# Patient Record
Sex: MALE | Race: WHITE | NOT HISPANIC OR LATINO | Employment: FULL TIME | URBAN - METROPOLITAN AREA
[De-identification: names, ages, dates, MRNs, and addresses within clinical notes are randomized per-mention and may not be internally consistent; named-entity substitution may affect disease eponyms.]

---

## 2019-01-11 ENCOUNTER — HOSPITAL ENCOUNTER (OUTPATIENT)
Dept: RADIOLOGY | Facility: MEDICAL CENTER | Age: 24
End: 2019-01-11
Attending: PHYSICIAN ASSISTANT
Payer: COMMERCIAL

## 2019-01-11 DIAGNOSIS — M25.312 INSTABILITY OF LEFT SHOULDER JOINT: ICD-10-CM

## 2019-01-11 DIAGNOSIS — M25.512 LEFT SHOULDER PAIN, UNSPECIFIED CHRONICITY: ICD-10-CM

## 2019-01-11 PROCEDURE — 73222 MRI JOINT UPR EXTREM W/DYE: CPT | Mod: LT

## 2019-01-11 PROCEDURE — 700101 HCHG RX REV CODE 250

## 2019-01-11 PROCEDURE — 700117 HCHG RX CONTRAST REV CODE 255: Performed by: PHYSICIAN ASSISTANT

## 2019-01-11 PROCEDURE — A9585 GADOBUTROL INJECTION: HCPCS | Performed by: PHYSICIAN ASSISTANT

## 2019-01-11 PROCEDURE — 23350 INJECTION FOR SHOULDER X-RAY: CPT | Mod: LT

## 2019-01-11 RX ORDER — GADOBUTROL 604.72 MG/ML
0.1 INJECTION INTRAVENOUS ONCE
Status: COMPLETED | OUTPATIENT
Start: 2019-01-11 | End: 2019-01-11

## 2019-01-11 RX ORDER — LIDOCAINE HYDROCHLORIDE 10 MG/ML
INJECTION, SOLUTION INFILTRATION; PERINEURAL
Status: DISCONTINUED
Start: 2019-01-11 | End: 2019-01-12 | Stop reason: HOSPADM

## 2019-01-11 RX ADMIN — GADOBUTROL 0.1 ML: 604.72 INJECTION INTRAVENOUS at 15:36

## 2019-01-11 RX ADMIN — IOHEXOL 5 ML: 300 INJECTION, SOLUTION INTRAVENOUS at 15:36

## 2019-02-04 ENCOUNTER — APPOINTMENT (OUTPATIENT)
Dept: ADMISSIONS | Facility: MEDICAL CENTER | Age: 24
End: 2019-02-04
Attending: ORTHOPAEDIC SURGERY
Payer: COMMERCIAL

## 2019-02-05 ENCOUNTER — HOSPITAL ENCOUNTER (OUTPATIENT)
Facility: MEDICAL CENTER | Age: 24
End: 2019-02-05
Attending: ORTHOPAEDIC SURGERY | Admitting: ORTHOPAEDIC SURGERY
Payer: COMMERCIAL

## 2019-02-05 VITALS
BODY MASS INDEX: 21.79 KG/M2 | OXYGEN SATURATION: 97 % | DIASTOLIC BLOOD PRESSURE: 95 MMHG | RESPIRATION RATE: 16 BRPM | HEIGHT: 71 IN | SYSTOLIC BLOOD PRESSURE: 150 MMHG | TEMPERATURE: 98.4 F | WEIGHT: 155.65 LBS | HEART RATE: 80 BPM

## 2019-02-05 PROCEDURE — 502581 HCHG PACK, SHOULDER ARTHROSCOPY: Performed by: ORTHOPAEDIC SURGERY

## 2019-02-05 PROCEDURE — 700101 HCHG RX REV CODE 250

## 2019-02-05 PROCEDURE — 160048 HCHG OR STATISTICAL LEVEL 1-5: Performed by: ORTHOPAEDIC SURGERY

## 2019-02-05 PROCEDURE — 160022 HCHG BLOCK: Performed by: ORTHOPAEDIC SURGERY

## 2019-02-05 PROCEDURE — 501838 HCHG SUTURE GENERAL: Performed by: ORTHOPAEDIC SURGERY

## 2019-02-05 PROCEDURE — 160046 HCHG PACU - 1ST 60 MINS PHASE II: Performed by: ORTHOPAEDIC SURGERY

## 2019-02-05 PROCEDURE — 700111 HCHG RX REV CODE 636 W/ 250 OVERRIDE (IP)

## 2019-02-05 PROCEDURE — 500028 HCHG ARTHROWAND TURBOVAC 3.5/90 SUCT.: Performed by: ORTHOPAEDIC SURGERY

## 2019-02-05 PROCEDURE — 160029 HCHG SURGERY MINUTES - 1ST 30 MINS LEVEL 4: Performed by: ORTHOPAEDIC SURGERY

## 2019-02-05 PROCEDURE — 302135 SEQUENTIAL COMPRESSION MACHINE: Performed by: ORTHOPAEDIC SURGERY

## 2019-02-05 PROCEDURE — 160041 HCHG SURGERY MINUTES - EA ADDL 1 MIN LEVEL 4: Performed by: ORTHOPAEDIC SURGERY

## 2019-02-05 PROCEDURE — A4565 SLINGS: HCPCS | Performed by: ORTHOPAEDIC SURGERY

## 2019-02-05 PROCEDURE — 500151 HCHG CANNULA, THRDED 8.4: Performed by: ORTHOPAEDIC SURGERY

## 2019-02-05 PROCEDURE — 160025 RECOVERY II MINUTES (STATS): Performed by: ORTHOPAEDIC SURGERY

## 2019-02-05 PROCEDURE — 160009 HCHG ANES TIME/MIN: Performed by: ORTHOPAEDIC SURGERY

## 2019-02-05 PROCEDURE — 160002 HCHG RECOVERY MINUTES (STAT): Performed by: ORTHOPAEDIC SURGERY

## 2019-02-05 PROCEDURE — 160035 HCHG PACU - 1ST 60 MINS PHASE I: Performed by: ORTHOPAEDIC SURGERY

## 2019-02-05 RX ORDER — SODIUM CHLORIDE, SODIUM LACTATE, POTASSIUM CHLORIDE, CALCIUM CHLORIDE 600; 310; 30; 20 MG/100ML; MG/100ML; MG/100ML; MG/100ML
INJECTION, SOLUTION INTRAVENOUS ONCE
Status: COMPLETED | OUTPATIENT
Start: 2019-02-05 | End: 2019-02-05

## 2019-02-05 RX ADMIN — SODIUM CHLORIDE, SODIUM LACTATE, POTASSIUM CHLORIDE, CALCIUM CHLORIDE: 600; 310; 30; 20 INJECTION, SOLUTION INTRAVENOUS at 12:42

## 2019-02-05 RX ADMIN — Medication 0.5 ML: at 12:42

## 2019-02-05 NOTE — OR NURSING
"Preadmit appointment: \" Preparing for your Procedure information\" sheet given to patient with verbal and written instructions. Patient instructed to continue prescribed medications through the day before surgery, instructed to take the following medications the day of surgery per anesthesia protocol: none.  "

## 2019-02-05 NOTE — OR NURSING
1349 received from or  resp spont l shoulder dressing c/d/i  Fingers warm  Cap refill<3 sec  Good movement  No c/o pain  Ice pack applied  1430  Meets discharge criteria

## 2019-02-05 NOTE — DISCHARGE INSTRUCTIONS
ACTIVITY: Rest and take it easy for the first 24 hours.  A responsible adult is recommended to remain with you during that time.  It is normal to feel sleepy.  We encourage you to not do anything that requires balance, judgment or coordination.    MILD FLU-LIKE SYMPTOMS ARE NORMAL. YOU MAY EXPERIENCE GENERALIZED MUSCLE ACHES, THROAT IRRITATION, HEADACHE AND/OR SOME NAUSEA.    FOR 24 HOURS DO NOT:  Drive, operate machinery or run household appliances.  Drink beer or alcoholic beverages.   Make important decisions or sign legal documents.    SPECIAL INSTRUCTIONS: Follow Dr Chambers post-op Shoulder instructions    DIET: To avoid nausea, slowly advance diet as tolerated, avoiding spicy or greasy foods for the first day.  Add more substantial food to your diet according to your physician's instructions.  Babies can be fed formula or breast milk as soon as they are hungry.  INCREASE FLUIDS AND FIBER TO AVOID CONSTIPATION.    SURGICAL DRESSING/BATHING:     FOLLOW-UP APPOINTMENT:  A follow-up appointment should be arranged with your doctor in  call to schedule.    You should CALL YOUR PHYSICIAN if you develop:  Fever greater than 101 degrees F.  Pain not relieved by medication, or persistent nausea or vomiting.  Excessive bleeding (blood soaking through dressing) or unexpected drainage from the wound.  Extreme redness or swelling around the incision site, drainage of pus or foul smelling drainage.  Inability to urinate or empty your bladder within 8 hours.  Problems with breathing or chest pain.    You should call 911 if you develop problems with breathing or chest pain.  If you are unable to contact your doctor or surgical center, you should go to the nearest emergency room or urgent care center.  Physician's telephone #: [phone]    If any questions arise, call your doctor.  If your doctor is not available, please feel free to call the Surgical Center at [phone].  The Center is open Monday through Friday from  7AM to 7PM.  You can also call the HEALTH HOTLINE open 24 hours/day, 7 days/week and speak to a nurse at [phone], or toll free at [phone].    A registered nurse may call you a few days after your surgery to see how you are doing after your procedure.    MEDICATIONS: Resume taking daily medication.  Take prescribed pain medication with food.  If no medication is prescribed, you may take non-aspirin pain medication if needed.  PAIN MEDICATION CAN BE VERY CONSTIPATING.  Take a stool softener or laxative such as senokot, pericolace, or milk of magnesia if needed.    Prescription at home.  Last pain medication given at     If your physician has prescribed pain medication that includes Acetaminophen (Tylenol), do not take additional Acetaminophen (Tylenol) while taking the prescribed medication.    Depression / Suicide Risk    As you are discharged from this [facility] facility, it is important to learn how to keep safe from harming yourself.    Recognize the warning signs:  · Abrupt changes in personality, positive or negative- including increase in energy   · Giving away possessions  · Change in eating patterns- significant weight changes-  positive or negative  · Change in sleeping patterns- unable to sleep or sleeping all the time   · Unwillingness or inability to communicate  · Depression  · Unusual sadness, discouragement and loneliness  · Talk of wanting to die  · Neglect of personal appearance   · Rebelliousness- reckless behavior  · Withdrawal from people/activities they love  · Confusion- inability to concentrate     If you or a loved one observes any of these behaviors or has concerns about self-harm, here's what you can do:  · Talk about it- your feelings and reasons for harming yourself  · Remove any means that you might use to hurt yourself (examples: pills, rope, extension cords, firearm)  · Get professional help from the community (Mental Health, Substance Abuse, psychological  "counseling)  · Do not be alone:Call your Safe Contact- someone whom you trust who will be there for you.  · Call your local CRISIS HOTLINE 546-6690 or 711-630-7055  · Call your local Children's Mobile Crisis Response Team [place] (148) 989-7357 or www.Clever  · Call the toll free National Suicide Prevention Hotlines   · National Suicide Prevention Lifeline 894-540-XBXV (1386)  [employer] T[employer] 800-SUICIDE (631-8743)    Peripheral Nerve Block Discharge Instructions from Same Day Surgery and Inpatient :    ·   What to Expect - Upper Extremity  · You may experience numbness and weakness in {ARM LOCATION PNB:953750}  on the same side as your surgery  · This is normal. For some people, this may be an unpleasant sensation. Be very careful with your numb limb  · Ask for help when you need it  Shoulder Surgery Side Effects  · In addition to numbness and weakness you may experience other symptoms  · Other nerves that are close to those nerves injected can also be affected by local anesthesia  · You may experience a hoarseness in your voice  · Your breathing may feel different  · You may also notice drooping of your eyelid, pupil constriction, and decreased sweating, on the side of your surgery  · All of these side effects are normal and will resolve when the local anesthetic wears off   Prevent Injury  · Protect the limb like a baby  · Beware of exposing your limb to extreme heat or cold or trauma  · The limb may be injured without you noticing because it is numb  · Keep the limb elevated whenever possible  · Do not sleep on the limb  · Change the position of the limb regularly  · Avoid putting pressure on your surgical limb  Pain Control  · The initial block on the day of surgery will make your extremity feel \"numb\"  · Any consecutive injection including prior to discharge from the hospital will make your extremity feel \"numb\"  · You may feel an aching or burning when the local anesthesia starts to " wear off  · Take pain pills as prescribed by your surgeon  · Call your surgeon or anesthesiologist if you do not have adequate pain control  ·

## 2019-02-05 NOTE — OP REPORT
DATE OF SERVICE:  02/05/2019    SURGEON:  Monico Mclaughlin MD    ASSISTANT:  Mary Kay Murdock PA-C    ANESTHESIOLOGIST:  Prem Baxter MD    PREOPERATIVE DIAGNOSIS:  Left shoulder posterior labral tear.    POSTOPERATIVE DIAGNOSES:  1.  Left shoulder posterior labral tear.  2.  Left shoulder synovitis.    PROCEDURES PERFORMED:  1.  Left shoulder arthroscopy.  2.  Left shoulder posterior labral debridement.  3.  Left shoulder synovectomy.    ANESTHESIA:  General anesthesia with single shot interscalene block.    INDICATIONS:  Treat left shoulder injury, improve pain, and improve function.    HISTORY OF PRESENT ILLNESS:  The patient is a very pleasant and active   23-year-old male who injured his left shoulder while bench pressing about 2   months ago.  His physical exam and MRI demonstrated some complex tearing of   the posterior labrum.  The patient underwent conservative management and   continued to have symptoms.  As a result, we discussed surgical intervention.    The patient has been n.p.o. since midnight.  He is medically cleared for   surgery by the anesthesia team.  The patient denies any history of   instability, dislocation or apprehension.    INFORMED CONSENT:  The patient was informed of the risks, benefits, and   alternatives to the planned operation.  The risks include, but not limited to   bleeding, infection, neurovascular damage, recurrent labral tear, pain,   stiffness, DVT, PE, MI, stroke, and death.  Advanced directives were reviewed.    After answering all questions, the patient elected to proceed with the   planned operation and an informed consent form was signed.     IMPLANTS:  None.    DESCRIPTION OF PROCEDURE:  The patient was identified in the preoperative   holding area.  The correct procedural side and site was identified and marked.    Patient received a single shot interscalene block by the anesthesia team   under ultrasound guidance.  He was then brought to the operating room and    transferred to the operating room table.  He underwent a general anesthesia.    Examination of the left shoulder demonstrated no overlying skin lesions,   abrasions, or lacerations.  There was a grade 2 anterior load and shift that   was symmetrical to the contralateral side.  There is actually no instability   with posterior load and shift that was also symmetrical to the contralateral   side.  There was negative sulcus sign.  Passive forward flexion and abduction   was to 160.  With the arm at the side, passive external rotation was to 50   degrees.  With the arm at 90 degrees of abduction, passive external rotation   was 95 degrees and passive internal rotation was 25 degrees.    The patient was then carefully placed in the lateral decubitus position with   the left side up and the right side down.  An axillary pillow was placed as   well.  All bony prominences were well padded.  The left upper extremity was   then prepped and draped in the normal standard sterile fashion and was placed   in approximately 10 pounds of traction utilizing a shoulder distraction tower.    A procedural pause was performed by the operating room team.  The procedure,   the patient identity, operative site, surgical site, and the procedure to be   performed were all verified.  The patient was given IV antibiotics prior to   incision.    I then created a standard posterior portal and placed the trocar and cannula   into the joint followed by the 30-degree arthroscope.  I then created a   standard anterior portal and began with a full diagnostic arthroscopy.    There was a small amount of synovitis throughout the glenohumeral joint.    There was a little bit of fraying of the posterior superior labrum consistent   with a type 1 SLAP tear, but actually no evidence of involvement of the biceps   anchor and no separation of the superior labrum from the glenoid.  No   erythema or tearing of the biceps as it exited the joint.  No obvious  tears of   the anterior labrum.  No obvious loose bodies or soft tissue debris within   the inferior axillary recess.  Examination of the posterior labrum   demonstrated some pretty significant tearing as well as a large flap component   of the posterior and inferior labrum from approximately the 11 o'clock to 6   o'clock position posteriorly.  There was fraying of the tissue, but really no   obvious separation of the bulk of the labrum from the adjacent glenoid.  Just   a little area of grade 1 softening over the posterior inferior glenoid;   otherwise, no significant cartilage pathology of the humeral head.  The   subscapularis was intact at its insertion on the lesser tuberosity.  The   articular side of the rotator cuff also appeared to be intact as well.  No   evidence of a Hill-Sachs or reverse Hill-Sachs defect.    I first turned my attention to the synovitis throughout the glenohumeral   joint.  This was gently debrided with the use of the oscillating suction   shaver device and electrocautery wand.  I then transitioned my camera to the   anterior portal and placed my shaver into the posterior portal.  Given the   labral parents and the lack of instability on history and physical exam, I   elected to proceed with a posterior labral debridement.  I gently debrided the   unstable and torn fragments of the labral tissue.  The bulk of the labral   tissue appeared to be attached nicely to the posterior glenoid.  Probing of   the remaining posterior labrum demonstrated it was nice and stable.  The   humeral head appeared to be centered nicely also within the glenoid.  The   oscillating suction shaver device was then placed back into the joint to   remove any soft tissue or bony debris.  Meticulous hemostasis obtained.  All   instruments were then removed.    All incisions were copiously irrigated.  The portal incisions were closed with   simple 3-0 Prolene suture followed by Steri-Strips and Xeroform.  A sterile    compressive dressing applied followed by a well-padded shoulder abduction   sling.    Needle and sponge counts were correct at the end of the procedure by the   circulating nurse.  The patient tolerated the procedure well with no   complication.  The patient was then transferred off the operating room table   onto the regular hospital bed.  He was extubated by the anesthesia team.    ESTIMATED BLOOD LOSS:  5 mL    COMPLICATIONS:  None.    TOURNIQUET TIME:  None.    SPECIMENS:  None.    WOUND TYPE:  Type 1 clean.    POSTOPERATIVE PLAN:  The patient will be transferred back to the postoperative   unit.  I expect he will be discharged from the hospital later this afternoon   once mobilizing safely and tolerating oral medications.  The patient will   remain nonweightbearing on the left upper extremity at this time.  We will   begin physical therapy in 5-7 days.       ____________________________________     MD FELIBERTO Robison / JOSE JUAN    DD:  02/05/2019 13:53:17  DT:  02/05/2019 14:26:51    D#:  [id number]  Job#:  [id number]

## 2019-02-05 NOTE — OR NURSING
1428 PT RECEIVED IN STAGE 2, REPORT RECEIVED.  PT DRESSED, TRANSFERRED TO RECLINER. 1514 PT DENIES PAIN, NAUSEA. SIMPLE SLING IN SITU. PT MEETS CRITERIA FOR D/C TO HOME.

## 2019-03-07 ENCOUNTER — NON-PROVIDER VISIT (OUTPATIENT)
Dept: URGENT CARE | Facility: PHYSICIAN GROUP | Age: 24
End: 2019-03-07

## 2019-03-07 DIAGNOSIS — Z02.1 PRE-EMPLOYMENT DRUG SCREENING: ICD-10-CM

## 2019-03-07 PROCEDURE — 8907 PR URINE COLLECT ONLY: Performed by: PHYSICIAN ASSISTANT

## 2019-08-23 ENCOUNTER — HOSPITAL ENCOUNTER (EMERGENCY)
Facility: MEDICAL CENTER | Age: 24
End: 2019-08-23
Attending: EMERGENCY MEDICINE
Payer: COMMERCIAL

## 2019-08-23 ENCOUNTER — APPOINTMENT (OUTPATIENT)
Dept: RADIOLOGY | Facility: MEDICAL CENTER | Age: 24
End: 2019-08-23
Attending: EMERGENCY MEDICINE
Payer: COMMERCIAL

## 2019-08-23 ENCOUNTER — OFFICE VISIT (OUTPATIENT)
Dept: URGENT CARE | Facility: PHYSICIAN GROUP | Age: 24
End: 2019-08-23
Payer: COMMERCIAL

## 2019-08-23 VITALS
OXYGEN SATURATION: 99 % | HEIGHT: 71 IN | WEIGHT: 157 LBS | RESPIRATION RATE: 16 BRPM | BODY MASS INDEX: 21.98 KG/M2 | DIASTOLIC BLOOD PRESSURE: 76 MMHG | TEMPERATURE: 98.3 F | HEART RATE: 70 BPM | SYSTOLIC BLOOD PRESSURE: 120 MMHG

## 2019-08-23 VITALS
BODY MASS INDEX: 21.88 KG/M2 | HEIGHT: 71 IN | TEMPERATURE: 98 F | RESPIRATION RATE: 16 BRPM | WEIGHT: 156.31 LBS | OXYGEN SATURATION: 99 % | SYSTOLIC BLOOD PRESSURE: 129 MMHG | DIASTOLIC BLOOD PRESSURE: 74 MMHG | HEART RATE: 62 BPM

## 2019-08-23 DIAGNOSIS — R42 DIZZINESS: ICD-10-CM

## 2019-08-23 DIAGNOSIS — R42 LIGHTHEADEDNESS: ICD-10-CM

## 2019-08-23 DIAGNOSIS — R07.9 CHEST PAIN, UNSPECIFIED TYPE: ICD-10-CM

## 2019-08-23 LAB
ALBUMIN SERPL BCP-MCNC: 4.9 G/DL (ref 3.2–4.9)
ALBUMIN/GLOB SERPL: 2.2 G/DL
ALP SERPL-CCNC: 70 U/L (ref 30–99)
ALT SERPL-CCNC: 13 U/L (ref 2–50)
ANION GAP SERPL CALC-SCNC: 9 MMOL/L (ref 0–11.9)
AST SERPL-CCNC: 15 U/L (ref 12–45)
BASOPHILS # BLD AUTO: 0.5 % (ref 0–1.8)
BASOPHILS # BLD: 0.04 K/UL (ref 0–0.12)
BILIRUB SERPL-MCNC: 0.6 MG/DL (ref 0.1–1.5)
BUN SERPL-MCNC: 15 MG/DL (ref 8–22)
CALCIUM SERPL-MCNC: 10 MG/DL (ref 8.5–10.5)
CHLORIDE SERPL-SCNC: 105 MMOL/L (ref 96–112)
CO2 SERPL-SCNC: 26 MMOL/L (ref 20–33)
CREAT SERPL-MCNC: 1.03 MG/DL (ref 0.5–1.4)
EKG 4674: NORMAL
EKG IMPRESSION: NORMAL
EOSINOPHIL # BLD AUTO: 0.09 K/UL (ref 0–0.51)
EOSINOPHIL NFR BLD: 1.1 % (ref 0–6.9)
ERYTHROCYTE [DISTWIDTH] IN BLOOD BY AUTOMATED COUNT: 39.5 FL (ref 35.9–50)
GLOBULIN SER CALC-MCNC: 2.2 G/DL (ref 1.9–3.5)
GLUCOSE SERPL-MCNC: 93 MG/DL (ref 65–99)
HCT VFR BLD AUTO: 48.7 % (ref 42–52)
HGB BLD-MCNC: 16.7 G/DL (ref 14–18)
IMM GRANULOCYTES # BLD AUTO: 0.04 K/UL (ref 0–0.11)
IMM GRANULOCYTES NFR BLD AUTO: 0.5 % (ref 0–0.9)
LYMPHOCYTES # BLD AUTO: 1.76 K/UL (ref 1–4.8)
LYMPHOCYTES NFR BLD: 22.2 % (ref 22–41)
MCH RBC QN AUTO: 31.4 PG (ref 27–33)
MCHC RBC AUTO-ENTMCNC: 34.3 G/DL (ref 33.7–35.3)
MCV RBC AUTO: 91.5 FL (ref 81.4–97.8)
MONOCYTES # BLD AUTO: 0.71 K/UL (ref 0–0.85)
MONOCYTES NFR BLD AUTO: 9 % (ref 0–13.4)
NEUTROPHILS # BLD AUTO: 5.29 K/UL (ref 1.82–7.42)
NEUTROPHILS NFR BLD: 66.7 % (ref 44–72)
NRBC # BLD AUTO: 0 K/UL
NRBC BLD-RTO: 0 /100 WBC
PLATELET # BLD AUTO: 287 K/UL (ref 164–446)
PMV BLD AUTO: 8.9 FL (ref 9–12.9)
POTASSIUM SERPL-SCNC: 3.9 MMOL/L (ref 3.6–5.5)
PROT SERPL-MCNC: 7.1 G/DL (ref 6–8.2)
RBC # BLD AUTO: 5.32 M/UL (ref 4.7–6.1)
SODIUM SERPL-SCNC: 140 MMOL/L (ref 135–145)
TROPONIN T SERPL-MCNC: <6 NG/L (ref 6–19)
WBC # BLD AUTO: 7.9 K/UL (ref 4.8–10.8)

## 2019-08-23 PROCEDURE — 99204 OFFICE O/P NEW MOD 45 MIN: CPT | Performed by: NURSE PRACTITIONER

## 2019-08-23 PROCEDURE — 84484 ASSAY OF TROPONIN QUANT: CPT

## 2019-08-23 PROCEDURE — 93005 ELECTROCARDIOGRAM TRACING: CPT | Performed by: EMERGENCY MEDICINE

## 2019-08-23 PROCEDURE — 85025 COMPLETE CBC W/AUTO DIFF WBC: CPT

## 2019-08-23 PROCEDURE — 93005 ELECTROCARDIOGRAM TRACING: CPT

## 2019-08-23 PROCEDURE — 80053 COMPREHEN METABOLIC PANEL: CPT

## 2019-08-23 PROCEDURE — 93000 ELECTROCARDIOGRAM COMPLETE: CPT | Performed by: NURSE PRACTITIONER

## 2019-08-23 PROCEDURE — 99283 EMERGENCY DEPT VISIT LOW MDM: CPT

## 2019-08-23 PROCEDURE — 71045 X-RAY EXAM CHEST 1 VIEW: CPT

## 2019-08-23 RX ORDER — ASPIRIN 81 MG/1
324 TABLET, CHEWABLE ORAL ONCE
Status: COMPLETED | OUTPATIENT
Start: 2019-08-23 | End: 2019-08-23

## 2019-08-23 RX ADMIN — ASPIRIN 324 MG: 81 TABLET, CHEWABLE ORAL at 15:45

## 2019-08-23 SDOH — HEALTH STABILITY: MENTAL HEALTH: HOW OFTEN DO YOU HAVE A DRINK CONTAINING ALCOHOL?: MONTHLY OR LESS

## 2019-08-23 ASSESSMENT — ENCOUNTER SYMPTOMS
VOMITING: 0
TINGLING: 1
NAUSEA: 1
DIZZINESS: 1
DIARRHEA: 0

## 2019-08-23 NOTE — ED TRIAGE NOTES
Chief Complaint   Patient presents with   • Sent from Urgent Care   • Chest Pain   • Dizziness   • Lightheadedness   • Tingling   • Nausea   Pt to triage in NAD.  Pt reports above chief complaints x 2 months, intermittent.  EKG completed.  Pt educated on triage process and instructed to notify triage RN of any change in status.

## 2019-08-23 NOTE — PROGRESS NOTES
"  Chief Complaint   Patient presents with   • Dizziness       HISTORY OF PRESENT ILLNESS: Patient is a 23 y.o. male who presents to urgent care today with complaints of dizziness.  Patient notes that for the past 2 months he has had symptoms, worsening slowly.  Admits to associated nausea, near-syncope, and intermittent left-sided chest pain.  His chest pain is typically left-sided, lasts for a few seconds.  He has not had any chest pain for 2 days.  He denies any vomiting, neck pain, leg swelling, or leg pain.  Denies any unilateral weakness, shortness of breath.  He believes his mother told him he had a \"hole in his heart \"when he was a child but otherwise denies any known cardiac history.  He has not taken any medication for symptom relief.  He rarely drinks alcohol, denies any drug use.    There are no active problems to display for this patient.      Allergies:Patient has no known allergies.    No current [facility]-ordered outpatient medications on file.     Current Facility-Administered Medications Ordered in Epic   Medication Dose Route Frequency Provider Last Rate Last Dose   • aspirin (ASA) chewable tab 324 mg  324 mg Oral Once CHEN Shrestha.P.R.N.           History reviewed. No pertinent past medical history.    Social History     Tobacco Use   • Smoking status: Never Smoker   • Smokeless tobacco: Never Used   Substance Use Topics   • Alcohol use: Yes     Comment: 2/week   • Drug use: No       No family status information on file.   History reviewed. No pertinent family history.    ROS:  Review of Systems   Constitutional: Negative for fever, chills, weight loss, malaise, and fatigue.   HENT: Negative for ear pain, nosebleeds, congestion, sore throat and neck pain.    Eyes: Negative for vision changes.   Neuro: Positive for dizziness.  Negative for headache, sensory changes, weakness, seizure, LOC.   Cardiovascular: Positive for recent chest pain, denies today.  Negative for palpitations, orthopnea and leg " "swelling.   Respiratory: Negative for cough, sputum production, shortness of breath and wheezing.   Gastrointestinal: Positive for nausea.  Negative for abdominal pain, vomiting or diarrhea.   Genitourinary: Negative for dysuria, urgency and frequency.  Musculoskeletal: Negative for falls, neck pain, back pain, joint pain, myalgias.   Skin: Negative for rash, diaphoresis.     Exam:  /76 (BP Location: Left arm, Patient Position: Sitting, BP Cuff Size: Adult)   Pulse 70   Temp 36.8 °C (98.3 °F) (Temporal)   Resp 16   Ht 1.803 m (5' 11\")   Wt 71.2 kg (157 lb)   SpO2 99%   General: well-nourished, well-developed male in NAD  Head: normocephalic, atraumatic  Eyes: PERRLA, no conjunctival injection, acuity grossly intact, lids normal.  Ears: normal shape and symmetry, no tenderness, no discharge. External canals are without any significant edema or erythema. Tympanic membranes are without any inflammation, no effusion. Gross auditory acuity is intact.  Nose: symmetrical without tenderness, no discharge.  Mouth/Throat: reasonable hygiene, no erythema, exudates or tonsillar enlargement.  Neck: no masses, range of motion within normal limits, no tracheal deviation. No obvious thyroid enlargement.   Lymph: no cervical adenopathy. No supraclavicular adenopathy.   Neuro: alert and oriented. Cranial nerves 1-12 grossly intact. No sensory deficit.   Cardiovascular: regular rate and rhythm. No edema.  Pulmonary: no distress. Chest is symmetrical with respiration, no wheezes, crackles, or rhonchi.   Abdomen: soft, non-tender, no guarding, no hepatosplenomegaly.  Musculoskeletal: no clubbing, appropriate muscle tone, gait is stable.  Skin: warm, dry, intact, no clubbing, no cyanosis, no rashes.   Psych: appropriate mood, affect, judgement.         Assessment/Plan:  1. Dizziness  EKG    UC AMA/Refusal of Treatment    aspirin (ASA) chewable tab 324 mg       12-lead study EKG interpretation, interpreted by myself.  The " rhythm is sinus with a rate of 71.  There is no ectopy. There are no T wave abnormalities.  Questionable ST elevation noted. Interpretation: Abnormal EKG, no previous for comparison.        Patient is a 23-year-old male presents today with dizziness.  He also admits to recent chest pain, nausea.  Upon examination his without distress or toxicity.  His vitals are normal.  His EKG notes questionable ST elevation but I have no previous for comparison.  He is given 24 mg of aspirin.  At this time, I feel the patient requires a higher level of care including closer monitoring, stat lab work and/or imaging for further evaluation. This has been discussed with the patient and he states agreement and understanding. I discussed with him that I recommend EMS transport at this time. However, patient is deciding against medical advice. I discussed with the patient the risks of deciding against receiving appropriate care to include death. The patient is not intoxicated. The patient is a capable decision maker and understands the risks and benefits of his decision. While I certainly disagree with the patient's decision, I respect the patient's autonomy and will not keep him here against his will. He understands that his decision to decline further care can be reversed at any time.  His wife is involved with the discussion and also understands my concern. I have asked the patient to sign an AMA form and MA to witness this signature. The patient will be driven directly to his emergency department of choice, he is in no acute distress upon departure, without changes.       Please note that this dictation was created using voice recognition software. I have made every reasonable attempt to correct obvious errors, but I expect that there are errors of grammar and possibly content that I did not discover before finalizing the note.      DAVIAN Shrestha.

## 2019-09-04 ENCOUNTER — OFFICE VISIT (OUTPATIENT)
Dept: MEDICAL GROUP | Facility: PHYSICIAN GROUP | Age: 24
End: 2019-09-04
Payer: COMMERCIAL

## 2019-09-04 VITALS
SYSTOLIC BLOOD PRESSURE: 122 MMHG | HEART RATE: 52 BPM | HEIGHT: 71 IN | DIASTOLIC BLOOD PRESSURE: 70 MMHG | OXYGEN SATURATION: 98 % | WEIGHT: 155 LBS | RESPIRATION RATE: 12 BRPM | TEMPERATURE: 98.9 F | BODY MASS INDEX: 21.7 KG/M2

## 2019-09-04 DIAGNOSIS — Z13.29 SCREENING FOR THYROID DISORDER: ICD-10-CM

## 2019-09-04 DIAGNOSIS — R20.2 TINGLING IN EXTREMITIES: ICD-10-CM

## 2019-09-04 DIAGNOSIS — Z13.21 ENCOUNTER FOR VITAMIN DEFICIENCY SCREENING: ICD-10-CM

## 2019-09-04 DIAGNOSIS — R07.9 INTERMITTENT CHEST PAIN: ICD-10-CM

## 2019-09-04 DIAGNOSIS — Z86.59 HISTORY OF ANXIETY: ICD-10-CM

## 2019-09-04 PROCEDURE — 99214 OFFICE O/P EST MOD 30 MIN: CPT | Performed by: NURSE PRACTITIONER

## 2019-09-04 ASSESSMENT — PATIENT HEALTH QUESTIONNAIRE - PHQ9: CLINICAL INTERPRETATION OF PHQ2 SCORE: 0

## 2019-09-04 NOTE — PROGRESS NOTES
CC: Establish care, episodes of chest pain, lightheadedness, tingling    HISTORY OF THE PRESENT ILLNESS: Patient is a 23 y.o. male. This pleasant patient is here today to establish care for evaluation management of the following health problems.      Intermittent chest pain  Patient reports multiple recurrent episodes of chest pain, dizziness, lightheadedness, tingling in extremities, nausea and near syncope.  Onset a couple months ago, denies any triggering event.  Was evaluated in [facility] on 8/23/19. EKG, CBC, CMP, Troponin, chest xray normal.  Reports symptoms occur once every day or two, lasting about an hour.  Occurs randomly while laying down or while standing.  Not relieved by anything.  Deep breathes to help prevent symptoms from getting worse. Symptoms cause increase in anxiety.  Will get more lab work and Holter monitor.        History of anxiety  Patient reports history of anxiety.  Took medication for about a 6-month.  2 years ago.  Does not remember the name of the medication.  Reports stopped taking it because he did not think he needed it any longer.  He does report that he finds that he is worried a great deal of the time about little things.  Sleeping okay, 7 to 8 hours a night. Denies SI/HI.Does report some difficulty concentrating. Works at [employer].  Discussed options including medications, counseling, routine aerobic exercise.  Patient not interested in counseling or medication management at this time.  He will try to start exercising routinely.      Allergies: Patient has no known allergies.    No current Epic-ordered outpatient medications on file.     No current [facility]-ordered facility-administered medications on file.        Past Medical History:   Diagnosis Date   • Anxiety    • Head ache        Past Surgical History:   Procedure Laterality Date   • SHOULDER ARTHROSCOPY Left 2/5/2019    Procedure: SHOULDER ARTHROSCOPY W/LABRAL DEBRIDEMENT;  Surgeon: Monico Mclaughlin M.D.;   Location: SURGERY [facility];  Service: Orthopedics   • DENTAL EXTRACTION(S)  2013    wisdom teeth removed   • OTHER ORTHOPEDIC SURGERY Right 2009    screw placed and removed and straightened   • FACIAL FRACTURE ORIF Bilateral 2004    facial    • TONSILLECTOMY  1995   • FACIAL FRACTURE ORIF     • SHOULDER ARTHROSCOPY Left        Social History     Tobacco Use   • Smoking status: Never Smoker   • Smokeless tobacco: Former User     Types: Chew   Substance Use Topics   • Alcohol use: Yes     Frequency: Monthly or less     Comment: 2/week   • Drug use: No       Family History   Problem Relation Age of Onset   • No Known Problems Mother    • No Known Problems Father    • No Known Problems Sister    • No Known Problems Brother        ROS:     - Constitutional: Negative for fever, chills, unexpected weight change, and fatigue/generalized weakness.     - HEENT: Negative for headaches, vision changes, hearing changes, ear pain, rhinorrhea, sinus congestion, and sore throat.      - Respiratory: Negative for cough, dyspnea, and wheezing.      - Cardiovascular: As in HPI, otherwise negative for palpitations, orthopnea, and bilateral lower extremity edema.     - Gastrointestinal: As in HPI, otherwise negative for vomiting, abdominal pain, hematochezia, melena, diarrhea, constipation.     - Genitourinary: Negative for dysuria, polyuria, hematuria, pyuria, urinary urgency, and urinary incontinence.    - Musculoskeletal: Negative for myalgias, back pain, and joint pain.     - Skin: Negative for rash, itching, cyanotic skin color change.     - Neurological: As in HPI, otherwise negative for tremors, focal sensory deficit, focal weakness and headaches.     - Endo/Heme/Allergies: Does not bruise/bleed easily.     - Psychiatric/Behavioral: Negative for depression, suicidal/homicidal ideation and memory loss.           Exam: /70 (BP Location: Left arm, Patient Position: Sitting, BP Cuff Size: Adult)   Pulse (!) 52   Temp  "37.2 °C (98.9 °F)   Resp 12   Ht 1.803 m (5' 11\")   Wt 70.3 kg (155 lb)   SpO2 98%  Body mass index is 21.62 kg/m².    General: Alert, pleasant, well nourished, well developed male in NAD  HEENT: Normocephalic. Eyes conjunctiva clear lids without ptosis, pupils equal and reactive to light, ears normal shape and contour, canals are clear bilaterally, tympanic membranes are pearly gray with good light reflex, nasal mucosa without erythema and drainage, oropharynx is without erythema, edema or exudates.   Neck: Supple without bruit. Thyroid is not enlarged.  Pulmonary: Clear to ausculation.  Normal effort. No rales, ronchi, or wheezing.  Cardiovascular: Normal rate and rhythm without murmur. Carotid and radial pulses are intact and equal bilaterally.  No lower extremity edema.  Abdomen: Soft, nontender, nondistended. Normal bowel sounds. Liver and spleen are not palpable  Neurologic: Grossly nonfocal  Lymph: No cervical or supraclavicular lymph nodes are palpable  Skin: Warm and dry.  No obvious lesions.  Musculoskeletal: Normal gait.   Psych: Normal mood and affect. Alert and oriented. Judgment and insight is normal.    Please note that this dictation was created using voice recognition software. I have made every reasonable attempt to correct obvious errors, but I expect that there are errors of grammar and possibly content that I did not discover before finalizing the note.      Assessment/Plan  1. Intermittent chest pain  Uncertain of etiology of symptoms.  Will get Holter monitor.  Consider referral to cardiology.  ER precautions reviewed with patient.  - HOLTER - Cardiology Performed (48HR); Future    2. Tingling in extremities  Will review lab results with patient at next appt.  - VITAMIN B12; Future    3. Screening for thyroid disorder  Will review lab results with patient at next appt.  - TSH; Future  - FREE THYROXINE; Future    4. Encounter for vitamin deficiency screening    - VITAMIN D,25 HYDROXY; " Future    5. History of anxiety  Patient will work on routine aerobic exercise.  Will consider counseling.  Consider hydroxyzine and/or Buspar.    Patient will Return to clinic in 3 weeks for lab review and Holter monitor review.  Patient will return to clinic sooner if needed.

## 2019-09-04 NOTE — ASSESSMENT & PLAN NOTE
Patient reports multiple recurrent episodes of chest pain, dizziness, lightheadedness, tingling in extremities, nausea and near syncope.  Onset a couple months ago, denies any triggering event.  Was evaluated in [facility] ER on 8/23/19. EKG, CBC, CMP, Troponin, chest xray normal.  Reports symptoms occur once every day or two, lasting about an hour.  Occurs randomly while laying down or while standing.  Not relieved by anything.  Deep breathes to help prevent symptoms from getting worse. Symptoms cause increase in anxiety.  Will get more lab work and Holter monitor.

## 2019-09-05 NOTE — ASSESSMENT & PLAN NOTE
Patient reports history of anxiety.  Took medication for about a 6-month.  2 years ago.  Does not remember the name of the medication.  Reports stopped taking it because he did not think he needed it any longer.  He does report that he finds that he is worried a great deal of the time about little things.  Sleeping okay, 7 to 8 hours a night. Denies SI/HI.Does report some difficulty concentrating. Works at [employer].  Discussed options including medications, counseling, routine aerobic exercise.  Patient not interested in counseling or medication management at this time.  He will try to start exercising routinely.

## 2019-09-06 ENCOUNTER — HOSPITAL ENCOUNTER (OUTPATIENT)
Dept: LAB | Facility: MEDICAL CENTER | Age: 24
End: 2019-09-06
Attending: NURSE PRACTITIONER
Payer: COMMERCIAL

## 2019-09-06 DIAGNOSIS — Z13.29 SCREENING FOR THYROID DISORDER: ICD-10-CM

## 2019-09-06 DIAGNOSIS — R20.2 TINGLING IN EXTREMITIES: ICD-10-CM

## 2019-09-06 DIAGNOSIS — Z13.21 ENCOUNTER FOR VITAMIN DEFICIENCY SCREENING: ICD-10-CM

## 2019-09-06 LAB
25(OH)D3 SERPL-MCNC: 40 NG/ML (ref 30–100)
T4 FREE SERPL-MCNC: 0.85 NG/DL (ref 0.53–1.43)
TSH SERPL DL<=0.005 MIU/L-ACNC: 1.61 UIU/ML (ref 0.38–5.33)
VIT B12 SERPL-MCNC: 298 PG/ML (ref 211–911)

## 2019-09-06 PROCEDURE — 36415 COLL VENOUS BLD VENIPUNCTURE: CPT

## 2019-09-06 PROCEDURE — 84439 ASSAY OF FREE THYROXINE: CPT

## 2019-09-06 PROCEDURE — 82607 VITAMIN B-12: CPT

## 2019-09-06 PROCEDURE — 84443 ASSAY THYROID STIM HORMONE: CPT

## 2019-09-06 PROCEDURE — 82306 VITAMIN D 25 HYDROXY: CPT

## 2022-12-21 NOTE — ED PROVIDER NOTES
"ED Provider Note    Scribed for Taylor Rocha M.D. by Jessica Garcia. 8/23/2019, 6:05 PM.  Means of arrival: private vehicle  History obtained from: patient  History limited by: none    CHIEF COMPLAINT  Chief Complaint   Patient presents with   • Sent from Urgent Care   • Chest Pain   • Dizziness   • Lightheadedness   • Tingling   • Nausea       HPI  Jean Paul Davis is a 23 y.o. male who presents to the Emergency Department for generalized malaise onset two months ago with multiple recurrent episodes earlier today prompting him to visit the ED. He notes that he has symptoms of intermittent chest pain, dizziness, lightheadedness, tingling to the extremities, nausea and near syncope. The patient states that he experiences the following symptoms intermittently and has at least one episode of each symptoms once daily over the last couple of weeks.  Negative vomiting or diarrhea. He denies any changes in diet or increased stress. The patient has no major past medical history, takes no daily medications, and has no allergies to medication.  No prominent history of cardiac disease.  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      REVIEW OF SYSTEMS  Review of Systems   Cardiovascular: Positive for chest pain.   Gastrointestinal: Positive for nausea. Negative for diarrhea and vomiting.   Neurological: Positive for dizziness and tingling (to the extremities).        Positive for lightheadedness  Positive for near syncope   All other systems reviewed and are negative.        PAST MEDICAL HISTORY   none pertinent    SURGICAL HISTORY   has a past surgical history that includes facial fracture orif (Bilateral, 2004); dental extraction(s) (2013); other orthopedic surgery (Right, 2009); tonsillectomy (1995); shoulder arthroscopy (Left, 2/5/2019); facial fracture orif; and shoulder arthroscopy (Left).    SOCIAL HISTORY  Social History     Tobacco Use   • " "Smoking status: Never Smoker   • Smokeless tobacco: Former User     Types: Chew   Substance Use Topics   • Alcohol use: Yes     Frequency: Monthly or less     Comment: 2/week   • Drug use: No      Social History     Substance and Sexual Activity   Drug Use No       FAMILY HISTORY  None pertinent    CURRENT MEDICATIONS  Home Medications    none         ALLERGIES  No Known Allergies    PHYSICAL EXAM  VITAL SIGNS: /81   Pulse 60   Temp 36.7 °C (98.1 °F) (Temporal)   Resp 14   Ht 1.803 m (5' 11\")   Wt 70.9 kg (156 lb 4.9 oz)   SpO2 99%   BMI 21.80 kg/m²   Vitals reviewed by myself.  Physical Exam   Constitutional: He is oriented to person, place, and time and well-developed, well-nourished, and in no distress. No distress.   HENT:   Head: Normocephalic.   Eyes: EOM are normal.   Neck: Normal range of motion.   Cardiovascular: Normal rate, regular rhythm and normal heart sounds. Exam reveals no gallop and no friction rub.   No murmur heard.  Pulmonary/Chest: Effort normal. No respiratory distress. He has no wheezes. He has no rales.   Musculoskeletal: Normal range of motion.   Neurological: He is alert and oriented to person, place, and time.   Skin: Skin is warm and dry.         DIAGNOSTIC STUDIES /  LABS  Labs Reviewed   CBC WITH DIFFERENTIAL - Abnormal; Notable for the following components:       Result Value    MPV 8.9 (*)     All other components within normal limits   COMP METABOLIC PANEL   TROPONIN   ESTIMATED GFR       All labs reviewed by me.    EKG Interpretation:  Interpreted by myself    12 Lead EKG interpreted by me to show:  EKG at 1629: Normal sinus bradycardia, heart rate 57, normal axis, normal intervals, , QRS 84, QTc 386, no acute ST-T segment changes,no prolonged intervals, no Brugada, no left ventricular hypertrophy, no evidence of acute arrhythmia or ischemia  My impression of this EKG: Does not indicate ischemia or arrhythmia at this time.    RADIOLOGY  DX-CHEST-PORTABLE (1 VIEW) "   Final Result      No acute cardiopulmonary process is seen.        The radiologist's interpretation of all radiological studies have been reviewed by me.      COURSE & MEDICAL DECISION MAKING  Nursing notes, VS, PMSFHx reviewed in chart.    Patient is a 23-year-old male who comes in for chest pain and lightheadedness.  Differential diagnosis includes arrhythmia, electrolyte disturbance, anemia.  I believe acute coronary syndrome is unlikely as patient is young with no risk factors.  He is PERC negative making pulmonary embolus unlikely.  Further diagnostic work-up includes labs, EKG and chest x-ray.    Patient's initial vitals are within normal limits and he is well-appearing.  EKG returns demonstrates no evidence of acute arrhythmia or ischemia.  Labs returned and are unremarkable.  Chest x-ray demonstrates no acute cardia pulmonary processes.  He has a HEART score of 0 making him low risk for acute coronary syndrome. Therefore patient is reassured, advised to follow-up with PCP as soon as possible,  has been contacted to help schedule PCP appointment.  Patient is then given strict return precautions and discharged home in stable condition.          FINAL IMPRESSION  1. Chest pain, unspecified type    2. Lightheadedness          Jessica HENSON (Scribe), am scribing for, and in the presence of, Taylor Rocha M.D..    Electronically signed by: Jessica Garcia (Lakeshaibe), 8/23/2019    Taylor HENSON M.D. personally performed the services described in this documentation, as scribed by Jessica Garcia in my presence, and it is both accurate and complete.    The note accurately reflects work and decisions made by me.  Taylor Rocha  8/23/2019  7:02 PM       Total Treatment Time: 3:02

## (undated) DEVICE — GLOVE, LITE (PAIR)

## (undated) DEVICE — TUBE E-T HI-LO CUFF 7.0MM (10EA/PK)

## (undated) DEVICE — KIT ROOM DECONTAMINATION

## (undated) DEVICE — GLOVE BIOGEL SZ 8 SURGICAL PF LTX - (50PR/BX 4BX/CA)

## (undated) DEVICE — SUTURE 3-0 PROLENE PS-1 (12PK/BX)

## (undated) DEVICE — NEPTUNE 4 PORT MANIFOLD - (20/PK)

## (undated) DEVICE — GLOVE SZ 7 BIOGEL PI MICRO - PF LF (50PR/BX 4BX/CA)

## (undated) DEVICE — CHLORAPREP 26 ML APPLICATOR - ORANGE TINT(25/CA)

## (undated) DEVICE — HEAD HOLDER JUNIOR/ADULT

## (undated) DEVICE — HUMID-VENT HEAT AND MOISTURE EXCHANGE- (50/BX)

## (undated) DEVICE — MASK ANESTHESIA ADULT  - (100/CA)

## (undated) DEVICE — SENSOR SPO2 NEO LNCS ADHESIVE (20/BX) SEE USER NOTES

## (undated) DEVICE — PACK SHOULDER ARTHROSCOPY SM - (2EA/CA)

## (undated) DEVICE — SODIUM CHL. IRRIGATION 0.9% 3000ML (4EA/CA 65CA/PF)

## (undated) DEVICE — SHAVER4.0 AGGRESSIVE + FORMLA (5EA/BX)

## (undated) DEVICE — KIT ANESTHESIA W/CIRCUIT & 3/LT BAG W/FILTER (20EA/CA)

## (undated) DEVICE — GOWN WARMING STANDARD FLEX - (30/CA)

## (undated) DEVICE — CANNULA TWIST IN 8.25MM X 7CM (5/BX)

## (undated) DEVICE — TUBING PUMP WITH CONNECTOR REDEUCE (1EA)

## (undated) DEVICE — SLING ORTH UNV TIETX VLFM ARM

## (undated) DEVICE — ELECTRODE 850 FOAM ADHESIVE - HYDROGEL RADIOTRNSPRNT (50/PK)

## (undated) DEVICE — TUBING PATIENT W/CONNECTOR REDEUCE (1EA)

## (undated) DEVICE — ARTHROWAND TURBOVAC 3.5/90 SCT

## (undated) DEVICE — SHAVER 5.5 RESECTOR FORMULA (5EA/BX )

## (undated) DEVICE — BLOCK

## (undated) DEVICE — GLOVE BIOGEL INDICATOR SZ 8 SURGICAL PF LTX - (50/BX 4BX/CA)

## (undated) DEVICE — TUBE CONNECTING SUCTION - CLEAR PLASTIC STERILE 72 IN (50EA/CA)

## (undated) DEVICE — CANISTER SUCTION RIGID RED 1500CC (40EA/CA)

## (undated) DEVICE — SUCTION INSTRUMENT YANKAUER BULBOUS TIP W/O VENT (50EA/CA)

## (undated) DEVICE — CLOSURE SKIN STRIP 1/2 X 4 IN - (STERI STRIP) (50/BX 4BX/CA)

## (undated) DEVICE — DRAPETIBURON SHOULDER W/POUCH - (5EA/CA)

## (undated) DEVICE — PROTECTOR ULNA NERVE - (36PR/CA)

## (undated) DEVICE — WATER IRRIGATION STERILE 1000ML (12EA/CA)

## (undated) DEVICE — SUTURE GENERAL